# Patient Record
(demographics unavailable — no encounter records)

---

## 2024-12-05 NOTE — REASON FOR VISIT
[CV Risk Factors and Non-Cardiac Disease] : CV risk factors and non-cardiac disease [Follow-Up - Clinic] : a clinic follow-up of [Dyspnea] : dyspnea [Hyperlipidemia] : hyperlipidemia [FreeTextEntry3] : Dr. Coats [FreeTextEntry1] : murmur

## 2024-12-05 NOTE — ASSESSMENT
[FreeTextEntry1] : Prior note nurse practitioner Danelle June 27, 2022::\par  \par  This is a 43 year old female who is here today for a follow up cardiac evaluation, \par  Medical hx includes hyperlipidemia and mitral valve prolapse. \par  \par  She is taking atorvastatin without issues.Pt reports occasional dyspnea on exertion.\par  Does not wish to stop smoking at present.\par  Cardiac risk factors include: current smoker, HLD, premature family CAD (father MI 54) and family history of HTN and DM (mother).\par  Pt drinks 1 glass of red wine per day, and drinks 1 cup of espresso per day. Pt denies drug use.\par  \par  Her cardiac risk factors include positive family history of heart disease (the father myocardial infarction at age 54), hypercholesterolemia, and smoking 1-1/2 packs per week.\par  \par  BLOOD PRESSURE:\par  -BP is well controlled in today's visit.\par  \par  -I have discussed the importance of maintaining good BP control and reviewed the newest guidelines with the patient while re-enforcing dietary sodium restrictions to no more than 2-3 g daily, DASH diet, life style modifications as well as the goal of maintaining ideal body weight with the patient today. I have advised the patient to avoid the use of over-the-counter medications/ supplements especially NSAIDS.\par  \par  BLOOD WORK:\par  -New blood work was done today, 06/27/2022 to evaluate lipid profile, CBC, BMP, hepatic function, A1C and TSH\par  \par  CHOLESTEROL CONTROL:\par  -Patient will continue the advised  TLC diet and to continue follow-up for treatment of hyperlipidemia and repeat blood testing with diet and exercise. I have discussed different exercises and the importance of maintenance of optimal body weight. The importance of staying within guidelines and recommendations was stressed to the patient today and they acknowledged that they understand this to me verbally.\par  \par   -Ms. PENN was educated and advised that failure to follow-up with my medical recommendations to lower cholesterol can result in severe health consequences therefore, they will continuing a low saturated and low fat diet and to avoid excessive carbohydrates to help reduce triglycerides and that lowering LDL levels is associated with a significant decrease in serious cardiac events including but not limited to heart attack stroke and overall death. We will continue lipid lowering agents as advised based on blood test results and the patient understands to call if they develop severe muscle discomfort or if they have a reddish tinted discoloration in their urine.\par  \par  TESTING/REPORTS:\par  -EKG done Jun 27, 2022 which demonstrated regular sinus rhythm with nonspecific ST-T wave changes, BPM of 71.\par  \par  The patient had a normal exercise stress test September 14, 202\par  \par  Electrocardiogram done July 21, 2021 demonstrates normal sinus rhythm rate 68 bpm is otherwise unremarkable.\par  \par  The patient had normal exercise stress test July 13, 2020.\par  \par  PLAN:\par  -She will continue with her usual medications and will contact the office if she is having any complaints between now and their next follow up appointment.\par  \par  -The patient will schedule an Exercise Stress Test rule out significant coronary artery disease and will schedule \par  an Echo Doppler examination to evaluate murmur, left ventricular function, chamber size, and rule out hypertrophy.\par  \par  I have discussed the plan of care with Ms. MATILDE PENN  and she  will follow up in 3 months. She is compliant with all of her medications.\par  \par  The patient understands that aerobic exercises must be increased to minutes 4 times/week and a detailed discussion of lifestyle modification was done today. \par  The patient has a good understanding of the diagnosis, treatment plan and lifestyle modification. \par  She will contact me at the office for any questions with their care or any changes in their health status.\par  \par  \par  Cedric MOORE

## 2024-12-05 NOTE — PHYSICAL EXAM
[Well Developed] : well developed [Well Nourished] : well nourished [No Acute Distress] : no acute distress [Normal Venous Pressure] : normal venous pressure [No Carotid Bruit] : no carotid bruit [Normal S1, S2] : normal S1, S2 [No Murmur] : no murmur [No Rub] : no rub [Clear Lung Fields] : clear lung fields [Good Air Entry] : good air entry [No Respiratory Distress] : no respiratory distress  [Soft] : abdomen soft [Non Tender] : non-tender [No Masses/organomegaly] : no masses/organomegaly [Normal Bowel Sounds] : normal bowel sounds [Normal Gait] : normal gait [No Edema] : no edema [No Cyanosis] : no cyanosis [No Clubbing] : no clubbing [No Varicosities] : no varicosities [No Rash] : no rash [No Skin Lesions] : no skin lesions [Moves all extremities] : moves all extremities [No Focal Deficits] : no focal deficits [Normal Speech] : normal speech [Alert and Oriented] : alert and oriented [Normal memory] : normal memory [General Appearance - Well Developed] : well developed [Normal Appearance] : normal appearance [General Appearance - Well Nourished] : well nourished [Normal Conjunctiva] : the conjunctiva exhibited no abnormalities [Normal Oral Mucosa] : normal oral mucosa [No Oral Pallor] : no oral pallor [Normal Oropharynx] : normal oropharynx [Normal Jugular Venous V Waves Present] : normal jugular venous V waves present [Respiration, Rhythm And Depth] : normal respiratory rhythm and effort [Exaggerated Use Of Accessory Muscles For Inspiration] : no accessory muscle use [Bowel Sounds] : normal bowel sounds [Abdomen Soft] : soft [Abdomen Tenderness] : non-tender [Abnormal Walk] : normal gait [Nail Clubbing] : no clubbing of the fingernails [Cyanosis, Localized] : no localized cyanosis [Petechial Hemorrhages (___cm)] : no petechial hemorrhages [Skin Color & Pigmentation] : normal skin color and pigmentation [Skin Turgor] : normal skin turgor [] : no rash [Oriented To Time, Place, And Person] : oriented to person, place, and time [Impaired Insight] : insight and judgment were intact [Affect] : the affect was normal [No Anxiety] : not feeling anxious [5th Left ICS - MCL] : palpated at the 5th LICS in the midclavicular line [Normal] : normal [No Precordial Heave] : no precordial heave was noted [Normal Rate] : normal [Rhythm Regular] : regular [Normal S1] : normal S1 [Normal S2] : normal S2 [No Gallop] : no gallop heard [II] : a grade 2 [2+] : left 2+ [No Abnormalities] : the abdominal aorta was not enlarged and no bruit was heard [No Pitting Edema] : no pitting edema present [S3] : no S3 [S4] : no S4 [Click] : no click [Pericardial Rub] : no pericardial rub

## 2025-07-01 NOTE — DISCUSSION/SUMMARY
[FreeTextEntry1] : This is a 46-year-old female past medical history significant for hypercholesterolemia, dyspepsia, mitral valve prolapse, murmur, status post COVID-19 infection December 2021, who comes in for cardiac follow-up evaluation.   She denies chest pain, shortness of breath, dizziness or syncope. The patient has stopped smoking. Her cardiac risk factors include positive family history of heart disease (the father myocardial infarction at age 54), hypercholesterolemia, and history of smoking 1-1/2 packs per week.  (She stopped smoking in 2025). The patient had a normal exercise stress test July 1, 2025. Echo Doppler examination done March 6, 2025 demonstrated normal left ventricular ejection fraction of 66%, physiologic mitral valve regurgitation, mild tricuspid valve regurgitation. The patient will continue on her current diet and exercise program.  She is complaining of some hair loss and I recommended she follow-up with her primary care physician and dermatologist. Electrocardiogram done December 5, 2024 demonstrated normal sinus rhythm at a rate of 67 bpm is otherwise unremarkable. Lipid panel done December 5, 2024 demonstrated cholesterol 191, HDL 64, triglyceride 91, LDL calculated 111, non-HDL cholesterol 127, LDL direct 209 mg/dL and hemoglobin A1c of 5.8.. The patient discontinued her atorvastatin in January 2024 secondary to "memory loss". Coronary artery calcium score done January 25, 2024 was 0. The patient started red rice yeast approximately 2 months ago. Lipid panel done October 30, 2024 demonstrated cholesterol 189, HDL 74, LDL calculated 98, non-HDL cholesterol 116, triglycerides 102 mg/dL. I have explained to her there is no data with red rice yeast reducing cardiovascular events.  Also red rice yeast is the mother molecule for the statin medications. I prefer she be on proven lipid-lowering therapy but will discuss this further when the results of blood tests are available for my review. Smoking cessation was recommended. I also asked her to increase her aerobic activity to 40 minutes 4 times per week. The patient had a normal exercise stress test December 28, 2022. Lipid profile done June 27, 2022 demonstrated cholesterol 181, HDL 63, triglycerides 107, LDL calculated 96, non-HDL cholesterol 118, LDL direct and 96 mg/dL with hemoglobin A1c of 5.4. The patient had a normal exercise stress test September 14, 2021 Electrocardiogram done July 21, 2021 demonstrates normal sinus rhythm rate 68 bpm is otherwise unremarkable. The patient had normal exercise stress test July 13, 2020. The patient had an elevated cardio CRP of 5.9 December 27, 2019.  This was associated with a cholesterol 161, HDL of 69, triglycerides of 71, and LDL direct 70 mg/dL. The patient had new blood work done July 13, 2020 which demonstrated cholesterol 163, triglycerides 76, HDL 64, LDL direct of 89 mg/dL with a high-sensitivity CRP of 2.5.  The patient is very concerned about early coronary artery disease given her father's history of early myocardial infarction. She is still smoking a pack per week.  Smoking cessation was discussed. The patient understands that aerobic exercises must be increased to 40 minutes 4 times per week. A detailed discussion of lifestyle modification was done today. The patient has a good understanding of the diagnosis, and treatment plan. Lifestyle modification was also outlined.